# Patient Record
Sex: FEMALE | Race: OTHER | HISPANIC OR LATINO | URBAN - METROPOLITAN AREA
[De-identification: names, ages, dates, MRNs, and addresses within clinical notes are randomized per-mention and may not be internally consistent; named-entity substitution may affect disease eponyms.]

---

## 2023-01-04 ENCOUNTER — EMERGENCY (EMERGENCY)
Facility: HOSPITAL | Age: 88
LOS: 1 days | Discharge: HOME | End: 2023-01-04
Attending: EMERGENCY MEDICINE
Payer: COMMERCIAL

## 2023-01-04 VITALS
TEMPERATURE: 98 F | OXYGEN SATURATION: 98 % | DIASTOLIC BLOOD PRESSURE: 73 MMHG | HEART RATE: 76 BPM | SYSTOLIC BLOOD PRESSURE: 166 MMHG | WEIGHT: 141.1 LBS | RESPIRATION RATE: 16 BRPM

## 2023-01-04 PROCEDURE — 73060 X-RAY EXAM OF HUMERUS: CPT

## 2023-01-04 PROCEDURE — 73030 X-RAY EXAM OF SHOULDER: CPT | Mod: 26,LT

## 2023-01-04 PROCEDURE — 99284 EMERGENCY DEPT VISIT MOD MDM: CPT

## 2023-01-04 PROCEDURE — 73060 X-RAY EXAM OF HUMERUS: CPT | Mod: 26,LT

## 2023-01-04 PROCEDURE — 93005 ELECTROCARDIOGRAM TRACING: CPT

## 2023-01-04 PROCEDURE — 73030 X-RAY EXAM OF SHOULDER: CPT

## 2023-01-04 PROCEDURE — 99284 EMERGENCY DEPT VISIT MOD MDM: CPT | Mod: 25

## 2023-01-04 RX ORDER — IBUPROFEN 200 MG
400 TABLET ORAL ONCE
Refills: 0 | Status: COMPLETED | OUTPATIENT
Start: 2023-01-04 | End: 2023-01-04

## 2023-01-04 RX ADMIN — Medication 400 MILLIGRAM(S): at 11:48

## 2023-01-04 NOTE — ED PROVIDER NOTE - PHYSICAL EXAMINATION
Exam:  General: Patient well appearing, hypertensive otherwise vital signs within normal limits  HEENT: airway patent with moist mucous membranes  Cardiac: RRR S1/S2 with strong peripheral pulses  Respiratory: lungs clear without respiratory distress  MSK: no gross deformity or edema of RUE, resisting ROM of shoulder secondary to pain  Neuro: no gross neurologic deficits, strength and sensation in wrist and elbow intact  Skin: warm, well perfused, no erythema  Psych: normal mood and affect

## 2023-01-04 NOTE — ED ADULT NURSE NOTE - NSIMPLEMENTINTERV_GEN_ALL_ED
Implemented All Fall Risk Interventions:  Henry to call system. Call bell, personal items and telephone within reach. Instruct patient to call for assistance. Room bathroom lighting operational. Non-slip footwear when patient is off stretcher. Physically safe environment: no spills, clutter or unnecessary equipment. Stretcher in lowest position, wheels locked, appropriate side rails in place. Provide visual cue, wrist band, yellow gown, etc. Monitor gait and stability. Monitor for mental status changes and reorient to person, place, and time. Review medications for side effects contributing to fall risk. Reinforce activity limits and safety measures with patient and family.

## 2023-01-04 NOTE — ED PROVIDER NOTE - OBJECTIVE STATEMENT
87-year-old woman presenting complaining of multiple days of left shoulder pain.  She reports pain is worse with ranging the shoulder and she is now unable to move it.  She tried Tylenol at home with some minimal relief of pain.  She denies any traumatic injuries leading to the pain.  She reports issues chronically in her knee on the other side but no issues in the shoulder in the past.  She denies any loss of sensation.  She denies any shortness of breath or radiation of the pain into the chest.

## 2023-01-04 NOTE — ED PROVIDER NOTE - CLINICAL SUMMARY MEDICAL DECISION MAKING FREE TEXT BOX
Patient presenting with most likely musculoskeletal left arm pain.  There is no edema to suggest a DVT.  Her pulses are strong and her sensation is intact so I do not suspect a neuropathic cause or vascular catastrophe as the cause of her pain.  Given her age and the possibility of an arthritis or rotator cuff injury is highly likely.  Also possibly would be a malignancy and a pathologic fracture.  Plan for pain control x-rays to screen for pathologic fracture or calcific tendinitis and likely discharge with close orthopedics follow-up and pain management.

## 2023-01-04 NOTE — ED PROVIDER NOTE - NSFOLLOWUPCLINICS_GEN_ALL_ED_FT
[facility]  Orthopedics  [address] [place] [address]  Phone: [phone]  Fax: [phone]  Follow Up Time: Routine

## 2023-01-04 NOTE — ED PROVIDER NOTE - NSFOLLOWUPINSTRUCTIONS_ED_ALL_ED_FT
Thank you for choosing [facility] for your health care.    You were seen for pain in your left arm.  An EKG was normal and x-rays showed no broken bones or dislocation.  It is likely that the cause of your pain is from a muscle in your arm or the rotator cuff in the shoulder.  It is possible to injure these muscles with very small injuries that he might not even remember.  You can continue taking Tylenol and Motrin at home as directed on the packaging for pain and you were prescribed a muscle relaxer that you can try if the pain does not improve.  You are given the information for an orthopedic surgeon to follow-up with if your pain persists to further work on the cause of the pain and with the long-term treatment of it will be.  Please return to the emergency department if you lose all sensation in your hand or arm, if it becomes blue and cold, for high fevers or for any other concerning symptoms.    Ciaran por elegir [facility] para ruiz atención médica.    Lo vieron por dolor en el brazo nelida. Un electrocardiograma fue normal y las radiografías no mostraron huesos rotos ni dislocaciones. Es probable que la causa de ruiz dolor sea un músculo en ruiz brazo o el manguito rotador en el hombro. Es posible lesionar estos músculos con lesiones muy pequeñas que quizás ni siquiera recuerde. Puede continuar tomando Tylenol y Motrin en casa bao se indica en el empaque para el dolor y le recetaron un relajante muscular que puede probar si el dolor no mejora. Se le da la información para que un cirujano ortopédico enzo un seguimiento si ruiz dolor persiste para seguir trabajando en la causa del dolor y con el tratamiento a leda plazo. Regrese al departamento de emergencias si pierde toda la sensibilidad en la mano o el brazo, si se pone alessandro y frío, si tiene fiebre gabriel o si tiene cualquier otro síntoma preocupante.

## 2023-01-04 NOTE — ED PROVIDER NOTE - PATIENT PORTAL LINK FT
You can access the FollowMyHealth Patient Portal offered by [facility] by registering at the following website: [email or web address]. By joining [email or web address]’s FollowMyHealth portal, you will also be able to view your health information using other applications (apps) compatible with our system.

## 2023-01-05 PROBLEM — I10 ESSENTIAL (PRIMARY) HYPERTENSION: Chronic | Status: ACTIVE | Noted: 2023-01-04

## 2023-01-06 ENCOUNTER — APPOINTMENT (OUTPATIENT)
Dept: ORTHOPEDIC SURGERY | Facility: CLINIC | Age: 88
End: 2023-01-06
Payer: MEDICARE

## 2023-01-06 VITALS — WEIGHT: 140 LBS | BODY MASS INDEX: 29.39 KG/M2 | HEIGHT: 58 IN

## 2023-01-06 DIAGNOSIS — M75.42 IMPINGEMENT SYNDROME OF LEFT SHOULDER: ICD-10-CM

## 2023-01-06 PROCEDURE — 20610 DRAIN/INJ JOINT/BURSA W/O US: CPT | Mod: LT

## 2023-01-06 PROCEDURE — 99204 OFFICE O/P NEW MOD 45 MIN: CPT | Mod: 25

## 2023-01-06 RX ORDER — NAPROXEN 500 MG/1
500 TABLET ORAL
Qty: 60 | Refills: 0 | Status: ACTIVE | COMMUNITY
Start: 2023-01-06 | End: 1900-01-01

## 2023-01-06 NOTE — PHYSICAL EXAM
[de-identified] : Constitutional: Well-nourished, well-developed, No acute distress\par Respiratory:  Good respiratory effort, no SOB\par Psychiatric: Pleasant and normal affect, alert and oriented x3\par Skin: Clean dry and intact B/L UE\par Musculoskeletal: normal except where as noted in regional exam\par \par \par Right Shoulder:\par APPEARANCE: no marked deformities, no swelling or malalignment\par POSITIVE TENDERNESS: none\par NONTENDER: supraspinatus, infraspinatus, teres minor, LH biceps, anterior and posterior capsule, AC joint\par ROM: full & painless, no scapular winging or dyskinesia present\par RESISTIVE TESTING: painless 5/5 resisted flex/ext, empty can/ER/IR, horizontal abd/add \par SPECIAL TESTS: neg Drop Arm, neg Empty Can, neg Barros/Neers, neg Raza's, neg Speeds, neg Apprehension, neg cross arm adduction, neg apley's scratch test\par \par Left Shoulder:\par APPEARANCE: no marked deformities, no swelling or malalignment\par POSITIVE TENDERNESS: supraspinatus, long head biceps tendon\par NONTENDER:  infraspinatus, teres minor. biceps. anterior and posterior capsule. AC joint. \par ROM: Flexion/abduction limited to 90 degrees with moderate painful arc past 60 degrees, no scapular winging or dyskinesia present\par RESISTIVE TESTING: MMT 4+/5 ER, Flexion and Empty can, 5/5 IR. painless 5/5 resisted ext, horizontal abd/add \par SPECIAL TESTS: + Barros and Neers, mildly + cross arm adduction, + Speeds, neg Raza's, neg Drop Arm, neg Apprehension. neg apley's scratch test\par \par  [de-identified] : I reviewed, interpreted and clinically correlated the following outside imaging studies,\par In system x-rays, 2 views of left shoulder and 2 views of left humerus, evidence of mild glenohumeral osteoarthritis, no calcific deposits seen in the subacromial space, no malalignment.\par \par ____________\par \par ACC: [id number] EXAM: XR HUMERUS MIN 2 VIEWS LT\par ACC: [id number] EXAM: XR SHOULDER COMP MIN 2V LT\par \par PROCEDURE DATE: 01/04/2023\par \par \par \par INTERPRETATION: Left shoulder and left humerus. Patient has local pain.\par \par Left shoulder. 2 views.\par \par There is mild degeneration around the corners of the humeral head. No fracture.\par \par Left humerus. 2 views.\par \par Bones intact.\par \par IMPRESSION: No acute finding.

## 2023-01-06 NOTE — DISCUSSION/SUMMARY
[de-identified] : Discussed findings of today's exam and possible causes of patient's pain.  Educated patient on their most probable diagnosis of atraumatic onset of left shoulder pain due to subacromial impingement.  Reviewed possible courses of treatment, and we collaboratively decided best course of treatment at this time will include conservative management.  We discussed various treatment options as well as associated risk/benefits/alternatives and patient elected to proceed with cortisone injection today (see procedure note).  Informed the patient that the numbing medicine in today's injection will last for about 4-6 hours. The steroid that was injected will start to work in 1 to 2 days, peak at 1-2 weeks, and may last up to 1-2 months.  Patient started on a course of oral NSAIDs, prescription given for Naprosyn (We discussed all possible side effects of this medication).  Patient will be started on a course of physical therapy to restore normal range of motion and strength as tolerated.  Follow up as needed.  Patient and adult daughters appreciate and agree with current plan.\par \par A  was utilized to communicate the patient's primary language.\par \par This note was generated using dragon medical dictation software.  A reasonable effort has been made for proofreading its contents, but typos may still remain.  If there are any questions or points of clarification needed please notify my office.

## 2023-01-06 NOTE — HISTORY OF PRESENT ILLNESS
[de-identified] : RHD Patient is here for left shoulder pain that began 3 weeks ago. There was no inciting injury. She was seen at the ER where xrays were taken. She has difficulty lifting her arm. She had worsening pain 2 days. She has taken Tylenol and Ibuprofen. Denies N/T/R/Prior injury. She does not work. She does not exercise. \par \par The patient's past medical history, past surgical history, medications and allergies were reviewed by me today and documented accordingly. In addition, the patient's family and social history, which were noncontributory to this visit, were reviewed also. Intake form was reviewed. The patient has no family history of arthritis.
